# Patient Record
Sex: FEMALE | Race: WHITE | NOT HISPANIC OR LATINO | Employment: UNEMPLOYED | URBAN - METROPOLITAN AREA
[De-identification: names, ages, dates, MRNs, and addresses within clinical notes are randomized per-mention and may not be internally consistent; named-entity substitution may affect disease eponyms.]

---

## 2017-01-24 ENCOUNTER — ALLSCRIPTS OFFICE VISIT (OUTPATIENT)
Dept: OTHER | Facility: OTHER | Age: 47
End: 2017-01-24

## 2017-01-26 ENCOUNTER — GENERIC CONVERSION - ENCOUNTER (OUTPATIENT)
Dept: OTHER | Facility: OTHER | Age: 47
End: 2017-01-26

## 2018-01-13 VITALS
WEIGHT: 100.4 LBS | BODY MASS INDEX: 17.79 KG/M2 | DIASTOLIC BLOOD PRESSURE: 64 MMHG | HEART RATE: 94 BPM | RESPIRATION RATE: 16 BRPM | SYSTOLIC BLOOD PRESSURE: 104 MMHG | HEIGHT: 63 IN | TEMPERATURE: 100.4 F

## 2018-01-15 NOTE — MISCELLANEOUS
Message  Return to work or school:   Vinny Vasquez is under my professional care  She was seen in my office on 01/24/2017       PLEASE EXCUSE FROM WORK 01/25/2017, 01/26/2017, AND 01/27/2017  Signatures   Electronically signed by :  Chidi Nails DO; Jan 27 2017  2:19PM EST                       (Author)

## 2018-02-28 ENCOUNTER — TELEPHONE (OUTPATIENT)
Dept: FAMILY MEDICINE CLINIC | Facility: CLINIC | Age: 48
End: 2018-02-28

## 2018-02-28 DIAGNOSIS — G47.9 DISTURBANCE, SLEEP: Primary | ICD-10-CM

## 2018-02-28 RX ORDER — ESZOPICLONE 3 MG/1
3 TABLET, FILM COATED ORAL
Qty: 30 TABLET | Refills: 0 | Status: SHIPPED | OUTPATIENT
Start: 2018-02-28 | End: 2018-04-16 | Stop reason: SDUPTHER

## 2018-02-28 RX ORDER — ESZOPICLONE 3 MG/1
1 TABLET, FILM COATED ORAL
COMMUNITY
Start: 2011-01-14 | End: 2018-02-28 | Stop reason: SDUPTHER

## 2018-02-28 NOTE — TELEPHONE ENCOUNTER
Patient is requesting a refill on ESCOPICLONE 3 mg 1 bedtime #06 [id number]

## 2018-04-16 ENCOUNTER — TELEPHONE (OUTPATIENT)
Dept: FAMILY MEDICINE CLINIC | Facility: CLINIC | Age: 48
End: 2018-04-16

## 2018-04-16 DIAGNOSIS — G47.9 DISTURBANCE, SLEEP: ICD-10-CM

## 2018-04-16 RX ORDER — ESZOPICLONE 3 MG/1
3 TABLET, FILM COATED ORAL
Qty: 30 TABLET | Refills: 0 | Status: SHIPPED | OUTPATIENT
Start: 2018-04-16 | End: 2018-05-08 | Stop reason: SDUPTHER

## 2018-04-16 NOTE — TELEPHONE ENCOUNTER
LM for patient to CB    Please inform patient she needs to sign a controlled substance agreement and also she needs an appt, last seen 01/2017

## 2018-05-08 ENCOUNTER — OFFICE VISIT (OUTPATIENT)
Dept: FAMILY MEDICINE CLINIC | Facility: CLINIC | Age: 48
End: 2018-05-08
Payer: COMMERCIAL

## 2018-05-08 VITALS
TEMPERATURE: 97.6 F | DIASTOLIC BLOOD PRESSURE: 96 MMHG | SYSTOLIC BLOOD PRESSURE: 132 MMHG | BODY MASS INDEX: 18.95 KG/M2 | RESPIRATION RATE: 18 BRPM | HEART RATE: 104 BPM | WEIGHT: 107 LBS

## 2018-05-08 DIAGNOSIS — G47.9 DISTURBANCE, SLEEP: ICD-10-CM

## 2018-05-08 PROCEDURE — 99213 OFFICE O/P EST LOW 20 MIN: CPT | Performed by: INTERNAL MEDICINE

## 2018-05-08 RX ORDER — ESTROGEN,CON/M-PROGEST ACET 0.45-1.5MG
1 TABLET ORAL DAILY
Refills: 0 | COMMUNITY
Start: 2018-04-16 | End: 2020-11-13 | Stop reason: SDUPTHER

## 2018-05-08 RX ORDER — ESZOPICLONE 3 MG/1
3 TABLET, FILM COATED ORAL
Qty: 90 TABLET | Refills: 0 | Status: SHIPPED | OUTPATIENT
Start: 2018-05-08 | End: 2018-10-05 | Stop reason: SDUPTHER

## 2018-05-08 RX ORDER — MULTIVITAMIN
1 CAPSULE ORAL DAILY
COMMUNITY

## 2018-05-08 RX ORDER — IBANDRONATE SODIUM 150 MG/1
TABLET, FILM COATED ORAL
Refills: 0 | COMMUNITY
Start: 2018-05-01

## 2018-05-08 NOTE — PROGRESS NOTES
ASSESSMENT and PLAN:  Alka Almazan is a 56 y o  female with:   Problem List Items Addressed This Visit     Disturbance, sleep    Relevant Medications    eszopiclone (LUNESTA) tablet          SUBJECTIVE:  Alka Almazan is a 56 y o  female who presents today with a chief complaint of Medication Refill (Suzie Reynolds)    Patient here for a refill of lunesta  She uses it nightly - otherwise she can't sleep  She is taking 3 mg per night  She otherwise feels well; she exervcises; she offers no other complaints  Review of Systems   Constitutional: Negative  HENT: Negative  Eyes: Negative  Respiratory: Negative  Cardiovascular: Negative  Gastrointestinal: Negative  Genitourinary: Negative  Musculoskeletal: Negative  Skin: Negative  Hematological: Negative  Psychiatric/Behavioral: Positive for sleep disturbance (not able to sleep without a sleep aid; otc meds did not help   she gets 6-8 hours per night and feels rested  )  Negative for decreased concentration, dysphoric mood, self-injury and suicidal ideas  The patient is not nervous/anxious and is not hyperactive  I have reviewed the patient's PMH, Social History, Medication List and Allergies  OBJECTIVE:  /96 (BP Location: Left arm, Patient Position: Sitting, Cuff Size: Adult)   Pulse 104   Temp 97 6 °F (36 4 °C) (Tympanic)   Resp 18   Wt 48 5 kg (107 lb)   BMI 18 95 kg/m²   Physical Exam   Constitutional: She is oriented to person, place, and time  She appears well-developed and well-nourished  No distress  HENT:   Head: Normocephalic and atraumatic  Right Ear: External ear normal    Left Ear: External ear normal    Eyes: Conjunctivae and EOM are normal  Pupils are equal, round, and reactive to light  Right eye exhibits no discharge  Left eye exhibits no discharge  Neck: Normal range of motion  Neck supple  No JVD present  No tracheal deviation present  No thyromegaly present     Cardiovascular: Normal rate, regular rhythm, normal heart sounds and intact distal pulses  Pulmonary/Chest: Effort normal and breath sounds normal  No respiratory distress  She has no wheezes  Abdominal: Soft  Bowel sounds are normal  She exhibits no distension  There is no tenderness  Musculoskeletal: Normal range of motion  She exhibits no edema or tenderness  Neurological: She is alert and oriented to person, place, and time  She has normal reflexes  No cranial nerve deficit  Coordination normal    Skin: Skin is warm and dry  No rash noted  She is not diaphoretic  No erythema  Psychiatric: She has a normal mood and affect  Her behavior is normal  Judgment and thought content normal    Nursing note and vitals reviewed

## 2018-05-09 PROBLEM — G47.9 DISTURBANCE, SLEEP: Status: ACTIVE | Noted: 2018-05-09

## 2018-05-09 NOTE — PATIENT INSTRUCTIONS
Insomnia   WHAT YOU NEED TO KNOW:   Insomnia is a condition that makes it hard to fall or stay asleep  Lack of sleep can lead to attention or memory problems during the day  You may also be travis, depressed, clumsy, or have headaches  DISCHARGE INSTRUCTIONS:   Contact your healthcare provider if:   · Your symptoms do not get better, or they get worse  · You begin to use drugs or alcohol to fall asleep  · You have questions or concerns about your condition or care  Medicines:   · Medicines  may help you sleep more regularly or help you feel less anxious  · Take your medicine as directed  Contact your healthcare provider if you think your medicine is not helping or if you have side effects  Tell him or her if you are allergic to any medicine  Keep a list of the medicines, vitamins, and herbs you take  Include the amounts, and when and why you take them  Bring the list or the pill bottles to follow-up visits  Carry your medicine list with you in case of an emergency  What you can do to improve your sleep:   · Create a sleep schedule  This will help you form a sleep routine  Keep a record of your sleep patterns, and any sleeping problems you have  Bring the record to follow-up visits with healthcare providers  · Do not take naps  Naps could make it hard for you to fall asleep at bedtime  · Keep your bedroom cool, quiet, and dark  Turn on white noise, such as a fan, to help you relax  Do not use your bed for any activity that will keep you awake  Do not read, exercise, eat, or watch TV in your bedroom  · Get up if you do not fall asleep within 20 minutes  Move to another room and do something relaxing until you become sleepy  · Limit caffeine, alcohol, and food to earlier in the day  Only drink caffeine in the morning  Do not drink alcohol within 6 hours of bedtime  Do not eat a heavy meal right before you go to bed  · Exercise regularly  Daily exercise may help you sleep better   Do not exercise within 4 hours of bedtime  Follow up with your healthcare provider as directed: Your healthcare provider may refer you for cognitive behavioral therapy  A behavioral therapist may help you find ways to relax, decrease stress, and improve sleep  Write down your questions so you remember to ask them during your visits  © 2017 [address] Information is for End User's use only and may not be sold, redistributed or otherwise used for commercial purposes  All illustrations and images included in CareNotes® are the copyrighted property of [employer] , [employer]  or Nate Ulloa  The above information is an  only  It is not intended as medical advice for individual conditions or treatments  Talk to your doctor, nurse or pharmacist before following any medical regimen to see if it is safe and effective for you

## 2018-10-05 DIAGNOSIS — G47.9 DISTURBANCE, SLEEP: ICD-10-CM

## 2018-10-05 RX ORDER — ESZOPICLONE 3 MG/1
3 TABLET, FILM COATED ORAL
Qty: 90 TABLET | Refills: 0 | Status: SHIPPED | OUTPATIENT
Start: 2018-10-05 | End: 2019-01-22 | Stop reason: SDUPTHER

## 2019-01-22 DIAGNOSIS — G47.9 DISTURBANCE, SLEEP: ICD-10-CM

## 2019-01-22 RX ORDER — ESZOPICLONE 3 MG/1
3 TABLET, FILM COATED ORAL
Qty: 30 TABLET | Refills: 0 | Status: SHIPPED | OUTPATIENT
Start: 2019-01-22 | End: 2020-01-02

## 2019-01-22 NOTE — TELEPHONE ENCOUNTER
Patient requesting a 90 day supply of Lunesta, 3 mg tablets to [facility] in [place] [address] site accessed, last fill on 10/08/2018  Last office visit on 05/08/2018, next office visit on 02/22/2019

## 2019-02-22 PROBLEM — F51.04 PSYCHOPHYSIOLOGICAL INSOMNIA: Status: ACTIVE | Noted: 2018-05-09

## 2019-02-26 ENCOUNTER — HOSPITAL ENCOUNTER (EMERGENCY)
Facility: HOSPITAL | Age: 49
Discharge: HOME | End: 2019-02-26
Attending: EMERGENCY MEDICINE | Admitting: EMERGENCY MEDICINE
Payer: COMMERCIAL

## 2019-02-26 VITALS
HEIGHT: 66 IN | BODY MASS INDEX: 16.87 KG/M2 | RESPIRATION RATE: 18 BRPM | DIASTOLIC BLOOD PRESSURE: 70 MMHG | WEIGHT: 104.94 LBS | TEMPERATURE: 98.2 F | OXYGEN SATURATION: 98 % | HEART RATE: 99 BPM | SYSTOLIC BLOOD PRESSURE: 142 MMHG

## 2019-02-26 DIAGNOSIS — F10.10 ALCOHOL ABUSE: Primary | ICD-10-CM

## 2019-02-26 LAB — ETHANOL EXG-MCNC: 0.35 MG/DL

## 2019-02-26 PROCEDURE — 99284 EMERGENCY DEPT VISIT MOD MDM: CPT

## 2019-02-26 PROCEDURE — 82075 ASSAY OF BREATH ETHANOL: CPT | Performed by: EMERGENCY MEDICINE

## 2019-02-26 NOTE — ED PROVIDER NOTES
History  Chief Complaint   Patient presents with    Alcohol Problem     Been wine drinking since 10am this mrchantel, reports difficulties going on at home  Pt reports being an alcoholic, reports binge drinking 3 times a week  " I just need something for the withdrawals " Is in outpatient counceling  Last drink was at 3m     72-year-old female comes in for alcohol abuse  Patient states that she has a known alcoholic and that she has been binge drinking since 10 o'clock this morning  Patient is currently in an outpatient counseling program   Patient specifically requests something for the withdrawals  I told patient that she is not currently withdrawing and that I would not be comfortable giving her anything at this time  Patient denies any injury  Patient states that she is only here because her mom made me come  I asked her she wanted help and she said yes however her idea of help is to get Librium and go home  I have offered patient to stay until she is below 0 08 to talk to crisis about different treatment options or we can give her a list of options and she is free to leave at this time as she has no withdrawal symptoms and is awake alert oriented x3 does not have any slurred speech and is able to walk under her own power however she must have a sober ride home  History provided by:  Spouse   used: No    Alcohol Intoxication   Similar prior episodes: yes    Severity:  Severe  Onset quality:  Gradual  Timing:  Constant  Progression:  Worsening  Chronicity:  Chronic  Suspected agents:  Alcohol  Associated symptoms: no abdominal pain, no agitation, no headaches and no shortness of breath    Risk factors: addiction treatment    Risk factors: no recent illness and no withdrawal syndrome        Prior to Admission Medications   Prescriptions Last Dose Informant Patient Reported? Taking?    Multiple Vitamin (MULTIVITAMIN) capsule   Yes No   Sig: Take 1 capsule by mouth daily   PREMPRO 0  45-1 5 MG per tablet  Self Yes No   Sig: Take 1 tablet by mouth daily   eszopiclone (LUNESTA) 3 MG tablet   No No   Sig: Take 1 tablet (3 mg total) by mouth daily at bedtime   ibandronate (BONIVA) 150 MG tablet  Self Yes No   Sig: take 1 tablet by mouth every month WITH 8 TO 10 OUNCES OF WATER,    (REFER TO PRESCRIPTION NOTES)  Facility-Administered Medications: None       Past Medical History:   Diagnosis Date    Ovarian failure        History reviewed  No pertinent surgical history  Family History   Problem Relation Age of Onset    Uterine cancer Mother     Diabetes Father     Hypertension Father         essential     I have reviewed and agree with the history as documented  Social History     Tobacco Use    Smoking status: Current Some Day Smoker     Packs/day: 0 25    Smokeless tobacco: Never Used   Substance Use Topics    Alcohol use: Yes     Comment: occasionally    Drug use: No        Review of Systems   Constitutional: Negative for fatigue and fever  HENT: Negative for congestion and ear pain  Eyes: Negative for discharge and redness  Respiratory: Negative for apnea, cough, shortness of breath and wheezing  Cardiovascular: Negative for chest pain  Gastrointestinal: Negative for abdominal pain and diarrhea  Endocrine: Negative for cold intolerance and polydipsia  Genitourinary: Negative for difficulty urinating and hematuria  Musculoskeletal: Negative for arthralgias and back pain  Skin: Negative for color change and rash  Allergic/Immunologic: Negative for environmental allergies and immunocompromised state  Neurological: Negative for numbness and headaches  Hematological: Negative for adenopathy  Does not bruise/bleed easily  Psychiatric/Behavioral: Negative for agitation and behavioral problems  Physical Exam  Physical Exam   Constitutional: She is oriented to person, place, and time   Vital signs are normal  She appears well-developed and well-nourished  Non-toxic appearance  HENT:   Head: Normocephalic and atraumatic  Right Ear: Tympanic membrane and external ear normal    Left Ear: Tympanic membrane and external ear normal    Nose: Nose normal  No rhinorrhea, sinus tenderness or nasal deformity  Mouth/Throat: Uvula is midline and oropharynx is clear and moist  Normal dentition  Eyes: Pupils are equal, round, and reactive to light  Conjunctivae, EOM and lids are normal  Right eye exhibits no discharge  Left eye exhibits no discharge  Neck: Trachea normal and normal range of motion  Neck supple  No JVD present  Carotid bruit is not present  Cardiovascular: Normal rate, regular rhythm, intact distal pulses and normal pulses  No extrasystoles are present  PMI is not displaced  Pulmonary/Chest: Effort normal and breath sounds normal  No accessory muscle usage  No respiratory distress  She has no wheezes  She has no rhonchi  She has no rales  Abdominal: Soft  Normal appearance and bowel sounds are normal  She exhibits no mass  There is no tenderness  There is no rigidity, no rebound and no guarding  Musculoskeletal:        Right shoulder: She exhibits normal range of motion, no bony tenderness, no swelling and no deformity  Cervical back: Normal  She exhibits normal range of motion, no tenderness, no bony tenderness and no deformity  Lymphadenopathy:     She has no cervical adenopathy  She has no axillary adenopathy  Neurological: She is alert and oriented to person, place, and time  She has normal strength and normal reflexes  No cranial nerve deficit or sensory deficit  GCS eye subscore is 4  GCS verbal subscore is 5  GCS motor subscore is 6  Skin: Skin is warm and dry  No rash noted  Psychiatric: She has a normal mood and affect  Her speech is normal and behavior is normal    Nursing note and vitals reviewed        Vital Signs  ED Triage Vitals [02/26/19 1814]   Temperature Pulse Respirations Blood Pressure SpO2 98 2 °F (36 8 °C) 99 18 142/70 98 %      Temp Source Heart Rate Source Patient Position - Orthostatic VS BP Location FiO2 (%)   Oral Monitor Sitting Right arm --      Pain Score       No Pain           Vitals:    02/26/19 1814   BP: 142/70   Pulse: 99   Patient Position - Orthostatic VS: Sitting       Visual Acuity      ED Medications  Medications - No data to display    Diagnostic Studies  Results Reviewed     Procedure Component Value Units Date/Time    POCT alcohol breath test [[id number]]  (Normal) Resulted:  02/26/19 1837    Lab Status:  Final result Updated:  02/26/19 1837     EXTBreath Alcohol 0 351    Rapid drug screen, urine [[id number]]     Lab Status:  No result Specimen:  Urine                  No orders to display              Procedures  Procedures       Phone Contacts  ED Phone Contact    ED Course                               MDM  Number of Diagnoses or Management Options  Alcohol abuse: new and requires workup     Amount and/or Complexity of Data Reviewed  Clinical lab tests: ordered and reviewed  Tests in the medicine section of CPT®: ordered and reviewed    Risk of Complications, Morbidity, and/or Mortality  General comments: Patient adamantly refuses inpatient  She does not want stated talked crisis stating airway have an outpatient counselor  Patient is persistent and asking for medications for withdrawal   I told her that this is not appropriate but if she wanted to come in for detox and rehab I would be happy to set her up with that  I did give patient paperwork for the host program and other outpatient resources  Patient's  here it is here states that the family is frustrated with her and they I have been trying to get her institutionalized  I explained to him that the patient does not have SI or HI and he would be un able to bring her in under psychiatric old      Patient Progress  Patient progress: stable      Disposition  Final diagnoses:   Alcohol abuse     Time reflects when diagnosis was documented in both MDM as applicable and the Disposition within this note     Time User Action Codes Description Comment    2/26/2019  6:48 PM Oli Thompson Add [F10 10] Alcohol abuse       ED Disposition     ED Disposition Condition Date/Time Comment    Discharge Stable Tu Feb 26, 2019  6:48 PM Eun Tenorio discharge to home/self care  Follow-up Information     Follow up With Specialties Details Why [id number]   [place] [address] [address]  [phone]            Patient's Medications   Discharge Prescriptions    No medications on file     No discharge procedures on file      ED Provider  Electronically Signed by           Austyn Bonner DO  02/26/19 0795

## 2019-02-26 NOTE — ED NOTES
Pt  Denies in patient counseling  MD made aware  Denies SI/HI        Haresh Maguire RN  02/26/19 [id number]

## 2019-06-03 ENCOUNTER — HOSPITAL ENCOUNTER (EMERGENCY)
Facility: HOSPITAL | Age: 49
Discharge: HOME | End: 2019-06-03
Attending: EMERGENCY MEDICINE | Admitting: EMERGENCY MEDICINE
Payer: COMMERCIAL

## 2019-06-03 VITALS
DIASTOLIC BLOOD PRESSURE: 97 MMHG | RESPIRATION RATE: 16 BRPM | TEMPERATURE: 98.1 F | OXYGEN SATURATION: 100 % | HEART RATE: 104 BPM | SYSTOLIC BLOOD PRESSURE: 124 MMHG

## 2019-06-03 DIAGNOSIS — F10.10 ALCOHOL ABUSE: ICD-10-CM

## 2019-06-03 DIAGNOSIS — F10.929 ALCOHOL INTOXICATION (HCC): Primary | ICD-10-CM

## 2019-06-03 LAB
AMPHETAMINES SERPL QL SCN: NEGATIVE
BARBITURATES UR QL: NEGATIVE
BENZODIAZ UR QL: NEGATIVE
COCAINE UR QL: NEGATIVE
ETHANOL EXG-MCNC: 0.25 MG/DL
METHADONE UR QL: NEGATIVE
OPIATES UR QL SCN: NEGATIVE
PCP UR QL: NEGATIVE
THC UR QL: NEGATIVE

## 2019-06-03 PROCEDURE — 80307 DRUG TEST PRSMV CHEM ANLYZR: CPT | Performed by: EMERGENCY MEDICINE

## 2019-06-03 PROCEDURE — 99284 EMERGENCY DEPT VISIT MOD MDM: CPT

## 2019-06-03 PROCEDURE — 99283 EMERGENCY DEPT VISIT LOW MDM: CPT | Performed by: EMERGENCY MEDICINE

## 2019-06-03 PROCEDURE — 82075 ASSAY OF BREATH ETHANOL: CPT | Performed by: EMERGENCY MEDICINE

## 2019-06-03 RX ORDER — NICOTINE 21 MG/24HR
21 PATCH, TRANSDERMAL 24 HOURS TRANSDERMAL ONCE
Status: DISCONTINUED | OUTPATIENT
Start: 2019-06-03 | End: 2019-06-03 | Stop reason: HOSPADM

## 2019-06-03 RX ADMIN — NICOTINE 21 MG: 21 PATCH TRANSDERMAL at 19:15

## 2020-01-02 ENCOUNTER — OFFICE VISIT (OUTPATIENT)
Dept: FAMILY MEDICINE CLINIC | Facility: CLINIC | Age: 50
End: 2020-01-02
Payer: COMMERCIAL

## 2020-01-02 VITALS
WEIGHT: 99 LBS | RESPIRATION RATE: 16 BRPM | BODY MASS INDEX: 15.98 KG/M2 | SYSTOLIC BLOOD PRESSURE: 138 MMHG | HEART RATE: 113 BPM | TEMPERATURE: 97.1 F | DIASTOLIC BLOOD PRESSURE: 88 MMHG | OXYGEN SATURATION: 97 %

## 2020-01-02 DIAGNOSIS — F41.9 ANXIETY: ICD-10-CM

## 2020-01-02 DIAGNOSIS — F51.04 PSYCHOPHYSIOLOGICAL INSOMNIA: Primary | ICD-10-CM

## 2020-01-02 PROCEDURE — 99214 OFFICE O/P EST MOD 30 MIN: CPT | Performed by: FAMILY MEDICINE

## 2020-01-02 RX ORDER — TRAZODONE HYDROCHLORIDE 50 MG/1
TABLET ORAL
Qty: 30 TABLET | Refills: 1 | Status: SHIPPED | OUTPATIENT
Start: 2020-01-02 | End: 2020-02-05 | Stop reason: SDUPTHER

## 2020-01-02 NOTE — PROGRESS NOTES
FAMILY MEDICINE PROGRESS NOTE  Eun Tenorio 51 y o  female   DATE: January 2, 2020     ASSESSMENT and PLAN:  Donta Varner is a 51 y o  female with:     1  Psychophysiological insomnia  Previously was on Lunesta 3mg daily, but stopped in January 2019 due to developing tolerance  Now has ongoing insomnia  Given comorbid severe anxiety, add Trazodone, call with update in 2-4 weeks and will titrate up dose  If resistant, refer to Sleep Medicine  Would try to avoid hypnotics/benzos given history of alcohol abuse  - traZODone (DESYREL) 50 mg tablet; 0 5 tab PO qHS x 1 week, then 1 tab PO qHS  Dispense: 30 tablet; Refill: 1    2  Anxiety  Pt has severe, uncontrolled anxiety, partially situational  Add Trazodone as above, likely will also need SSRI, I e  Lexapro  Stop OTC 5HTP supplements  Recommended routine labs, states she goes regularly for her Endocrinologist, so will send us records, she has labs scheduled for this month  Complete form for work, allowing her to work as long as she is compliant with treatment plan as above  SUBJECTIVE:  Donta Varner is a 51 y o  female who presents today with a chief complaint of Insomnia  Pt is here to discuss insomnia and anxiety  She previously had been on Lunesta 3mg for 15 years  But then she felt it stopped working, so stopped taking it in January 2019  She has been going to work without any sleep for days  She was at her work and nodded off at work 2 days in a row and they told her she had to go home  She needs an authorization letter to say she can get back to work  Patient works at a desk job, no heavy lifting  Normally she has no sleep schedule, tries to get to bed around 10-11pm and will be awake with racing thoughts and sleeps 1-2 hours at a stretches  She takes CBD oil and stress gummies  She takes Melatonin/Valerian root/5TP  June 2019- she had multiple ER visits and had been drinking alcohol   She states she knows she had a drinking problem at that time, but states she is no longer drinking any alcohol  Denies drug use, is smoking cigarettes  Also thinks her anxiety is out of control  She has  from her  and had a hard holiday season recently  Pt has a h/o premature ovarian failure, now has osteoporosis and f/w Dr Debra Ahumada (Endo) and is currently on Boniva for the past 9 years  Review of Systems   Constitutional: Positive for fatigue  Psychiatric/Behavioral: Positive for sleep disturbance  The patient is nervous/anxious  I have reviewed the patient's Past Medical History  Current Outpatient Medications:     ibandronate (BONIVA) 150 MG tablet, take 1 tablet by mouth every month WITH 8 TO 10 OUNCES OF WATER,    (REFER TO PRESCRIPTION NOTES)  , Disp: , Rfl: 0    Multiple Vitamin (MULTIVITAMIN) capsule, Take 1 capsule by mouth daily, Disp: , Rfl:     PREMPRO 0 45-1 5 MG per tablet, Take 1 tablet by mouth daily, Disp: , Rfl: 0    eszopiclone (LUNESTA) 3 MG tablet, Take 1 tablet (3 mg total) by mouth daily at bedtime (Patient not taking: Reported on 1/2/2020), Disp: 30 tablet, Rfl: 0    OBJECTIVE:  /88   Pulse (!) 113   Temp (!) 97 1 °F (36 2 °C)   Resp 16   Wt 44 9 kg (99 lb)   SpO2 97%   BMI 15 98 kg/m²    Physical Exam   Constitutional: No distress  underweight   Skin: She is not diaphoretic  Psychiatric: Her speech is normal and behavior is normal  Judgment and thought content normal  Her mood appears anxious  Cognition and memory are normal  She expresses no homicidal and no suicidal ideation  BMI Counseling: Body mass index is 15 98 kg/m²  The BMI is below normal  Patient advised to gain weight  Tobacco Cessation Counseling: Tobacco cessation counseling was provided  The patient is sincerely urged to quit consumption of tobacco  She is not ready to quit tobacco  Medication options and side effects of medication not discussed  Jose Lo MD    Note: Portions of the record have been created with voice recognition software  Occasional wrong word or "sound a like" substitutions may have occurred due to the inherent limitations of voice recognition software  Read the chart carefully and recognize, using context, where substitutions have occurred

## 2020-02-05 ENCOUNTER — OFFICE VISIT (OUTPATIENT)
Dept: FAMILY MEDICINE CLINIC | Facility: CLINIC | Age: 50
End: 2020-02-05
Payer: COMMERCIAL

## 2020-02-05 VITALS
OXYGEN SATURATION: 97 % | HEART RATE: 106 BPM | SYSTOLIC BLOOD PRESSURE: 130 MMHG | TEMPERATURE: 98.4 F | BODY MASS INDEX: 15.11 KG/M2 | HEIGHT: 66 IN | WEIGHT: 94 LBS | RESPIRATION RATE: 16 BRPM | DIASTOLIC BLOOD PRESSURE: 80 MMHG

## 2020-02-05 DIAGNOSIS — Z12.39 BREAST CANCER SCREENING: ICD-10-CM

## 2020-02-05 DIAGNOSIS — R22.2 MASS OF CHEST WALL, RIGHT: ICD-10-CM

## 2020-02-05 DIAGNOSIS — F10.10 ALCOHOL ABUSE: Primary | ICD-10-CM

## 2020-02-05 DIAGNOSIS — Z11.4 ENCOUNTER FOR SCREENING FOR HIV: ICD-10-CM

## 2020-02-05 DIAGNOSIS — F51.04 PSYCHOPHYSIOLOGICAL INSOMNIA: ICD-10-CM

## 2020-02-05 DIAGNOSIS — F10.231 ALCOHOL WITHDRAWAL SYNDROME, WITH DELIRIUM (HCC): ICD-10-CM

## 2020-02-05 PROCEDURE — 4004F PT TOBACCO SCREEN RCVD TLK: CPT | Performed by: FAMILY MEDICINE

## 2020-02-05 PROCEDURE — 3008F BODY MASS INDEX DOCD: CPT | Performed by: FAMILY MEDICINE

## 2020-02-05 PROCEDURE — 99214 OFFICE O/P EST MOD 30 MIN: CPT | Performed by: FAMILY MEDICINE

## 2020-02-05 RX ORDER — TRAZODONE HYDROCHLORIDE 100 MG/1
100 TABLET ORAL
Qty: 30 TABLET | Refills: 1 | Status: SHIPPED | OUTPATIENT
Start: 2020-02-05 | End: 2020-06-01 | Stop reason: SDUPTHER

## 2020-02-05 RX ORDER — LORAZEPAM 0.5 MG/1
0.5 TABLET ORAL 2 TIMES DAILY PRN
Qty: 20 TABLET | Refills: 0 | Status: SHIPPED | OUTPATIENT
Start: 2020-02-05

## 2020-02-05 RX ORDER — HYDROXYZINE HYDROCHLORIDE 25 MG/1
25 TABLET, FILM COATED ORAL 2 TIMES DAILY PRN
Qty: 30 TABLET | Refills: 0 | Status: SHIPPED | OUTPATIENT
Start: 2020-02-05 | End: 2020-02-05

## 2020-02-05 NOTE — PROGRESS NOTES
FAMILY MEDICINE PROGRESS NOTE  Eun Tenorio 51 y o  female   DATE: February 5, 2020     ASSESSMENT and PLAN:  Mauricio Lawson is a 51 y o  female with:     1  Alcohol abuse  Inconsistent history, but per most recent recollection, patient is a daily drinker and on exam appears to be going through alcohol withdrawal with DTs  Advised she needs to start thiamine and folic acid supplements, check labs  Consider acamprosate or disulfiram PRN, but check labs prior  Rx for Ativan PRN for withdrawal DTs  Pt to follow up with counselor in 2 days, consider restarting AA meetings and follow up here in 1 week or sooner PRN  - Comprehensive metabolic panel; Future  - CBC and differential; Future  - TSH, 3rd generation with Free T4 reflex; Future  - Protime-INR; Future  - Vitamin B1, whole blood; Future  - Folate; Future    2  Alcohol withdrawal syndrome, with delirium (HCC)  - LORazepam (ATIVAN) 0 5 mg tablet; Take 1 tablet (0 5 mg total) by mouth 2 (two) times a day as needed for anxiety  Dispense: 20 tablet; Refill: 0    3  Psychophysiological insomnia  Improvement in sleep with Trazodone 50mg, likely worsened now due to acute alcohol withdrawal, increase to 100mg qHS  - traZODone (DESYREL) 100 mg tablet; Take 1 tablet (100 mg total) by mouth daily at bedtime  Dispense: 30 tablet; Refill: 1    4  Mass of chest wall, right  Discussed that I am not palpating any abnormality, the location of the "bump" is her rib, likely more noticeable with her weight loss likely due to #1  If concern persists, check rib films    5  Breast cancer screening  H/o dense breast tissue  - Mammo screening bilateral w 3d & cad; Future    6  Encounter for screening for HIV  - HIV 1/2 AG-AB combo; Future    Patient agreeable with the plan and expressed understanding  I discucssed signs and symptoms for which to RTC, go to ER or seek urgent medical care       SUBJECTIVE:  Mauricio Lawson is a 51 y o  female who presents today with a chief complaint of Mass (right clavicle)  Pt here for eval of a bump on her right clavicle that she noticed after a shower when she was applying body lotion 1 week ago  It is not painful, no change in size, no rash, wasn't sure if its a bone or a pimple  She tried Proactive without relief  Also, she is requesting a medication to help with cravings for her alcohol use  Initially patient stated that her last drink was 7 days ago, after further questioning, she states it was 3 days ago and 3 glasses of wine, but then stated she had been drinking 4 days straight  Today she is shaking and thinks she is going through withdrawal  She has gone to AA in the past, but not currently  She does see a counselor regularly, has an appt this week  She states she is ready to quit because she promised her son she would  Review of Systems   Constitutional: Positive for unexpected weight change (5# weight loss in 1 month)  Skin: Negative for rash and wound  Neurological: Positive for tremors  Psychiatric/Behavioral: Positive for sleep disturbance  The patient is nervous/anxious  I have reviewed the patient's Past Medical History  OBJECTIVE:  /80   Pulse (!) 106   Temp 98 4 °F (36 9 °C)   Resp 16   Ht 5' 6" (1 676 m)   Wt 42 6 kg (94 lb)   SpO2 97%   BMI 15 17 kg/m²    Physical Exam   Constitutional: She appears cachectic  Neurological: She displays tremor  Psychiatric: Her mood appears anxious  She is agitated  Adrian Cain MD    Note: Portions of the record have been created with voice recognition software  Occasional wrong word or "sound a like" substitutions may have occurred due to the inherent limitations of voice recognition software  Read the chart carefully and recognize, using context, where substitutions have occurred

## 2020-02-26 ENCOUNTER — APPOINTMENT (OUTPATIENT)
Dept: LAB | Facility: CLINIC | Age: 50
End: 2020-02-26
Payer: COMMERCIAL

## 2020-02-26 DIAGNOSIS — Z11.4 ENCOUNTER FOR SCREENING FOR HIV: ICD-10-CM

## 2020-02-26 DIAGNOSIS — F10.10 ALCOHOL ABUSE: ICD-10-CM

## 2020-02-26 LAB
ALBUMIN SERPL BCP-MCNC: 4.1 G/DL (ref 3.5–5)
ALP SERPL-CCNC: 57 U/L (ref 46–116)
ALT SERPL W P-5'-P-CCNC: 25 U/L (ref 12–78)
ANION GAP SERPL CALCULATED.3IONS-SCNC: 8 MMOL/L (ref 4–13)
AST SERPL W P-5'-P-CCNC: 17 U/L (ref 5–45)
BASOPHILS # BLD AUTO: 0.01 THOUSANDS/ΜL (ref 0–0.1)
BASOPHILS NFR BLD AUTO: 0 % (ref 0–1)
BILIRUB SERPL-MCNC: 0.5 MG/DL (ref 0.2–1)
BUN SERPL-MCNC: 12 MG/DL (ref 5–25)
CALCIUM SERPL-MCNC: 8.9 MG/DL (ref 8.3–10.1)
CHLORIDE SERPL-SCNC: 104 MMOL/L (ref 100–108)
CO2 SERPL-SCNC: 27 MMOL/L (ref 21–32)
CREAT SERPL-MCNC: 0.68 MG/DL (ref 0.6–1.3)
EOSINOPHIL # BLD AUTO: 0.03 THOUSAND/ΜL (ref 0–0.61)
EOSINOPHIL NFR BLD AUTO: 1 % (ref 0–6)
ERYTHROCYTE [DISTWIDTH] IN BLOOD BY AUTOMATED COUNT: 14.2 % (ref 11.6–15.1)
FERRITIN SERPL-MCNC: 255 NG/ML (ref 8–388)
FOLATE SERPL-MCNC: >20 NG/ML (ref 3.1–17.5)
GFR SERPL CREATININE-BSD FRML MDRD: 104 ML/MIN/1.73SQ M
GLUCOSE P FAST SERPL-MCNC: 105 MG/DL (ref 65–99)
HCT VFR BLD AUTO: 34.9 % (ref 34.8–46.1)
HGB BLD-MCNC: 11.1 G/DL (ref 11.5–15.4)
IMM GRANULOCYTES # BLD AUTO: 0.01 THOUSAND/UL (ref 0–0.2)
IMM GRANULOCYTES NFR BLD AUTO: 0 % (ref 0–2)
INR PPP: 1.16 (ref 0.84–1.19)
IRON SATN MFR SERPL: 30 %
IRON SERPL-MCNC: 105 UG/DL (ref 50–170)
LYMPHOCYTES # BLD AUTO: 0.86 THOUSANDS/ΜL (ref 0.6–4.47)
LYMPHOCYTES NFR BLD AUTO: 24 % (ref 14–44)
MCH RBC QN AUTO: 31.7 PG (ref 26.8–34.3)
MCHC RBC AUTO-ENTMCNC: 31.8 G/DL (ref 31.4–37.4)
MCV RBC AUTO: 100 FL (ref 82–98)
MONOCYTES # BLD AUTO: 0.48 THOUSAND/ΜL (ref 0.17–1.22)
MONOCYTES NFR BLD AUTO: 14 % (ref 4–12)
NEUTROPHILS # BLD AUTO: 2.17 THOUSANDS/ΜL (ref 1.85–7.62)
NEUTS SEG NFR BLD AUTO: 61 % (ref 43–75)
NRBC BLD AUTO-RTO: 0 /100 WBCS
PLATELET # BLD AUTO: 151 THOUSANDS/UL (ref 149–390)
PMV BLD AUTO: 11.7 FL (ref 8.9–12.7)
POTASSIUM SERPL-SCNC: 3.5 MMOL/L (ref 3.5–5.3)
PROT SERPL-MCNC: 7.4 G/DL (ref 6.4–8.2)
PROTHROMBIN TIME: 14.4 SECONDS (ref 11.6–14.5)
RBC # BLD AUTO: 3.5 MILLION/UL (ref 3.81–5.12)
SODIUM SERPL-SCNC: 139 MMOL/L (ref 136–145)
T4 FREE SERPL-MCNC: 0.7 NG/DL (ref 0.76–1.46)
TIBC SERPL-MCNC: 351 UG/DL (ref 250–450)
TSH SERPL DL<=0.05 MIU/L-ACNC: 6.97 UIU/ML (ref 0.36–3.74)
VIT B12 SERPL-MCNC: 955 PG/ML (ref 100–900)
WBC # BLD AUTO: 3.56 THOUSAND/UL (ref 4.31–10.16)

## 2020-02-26 PROCEDURE — 83550 IRON BINDING TEST: CPT

## 2020-02-26 PROCEDURE — 82746 ASSAY OF FOLIC ACID SERUM: CPT

## 2020-02-26 PROCEDURE — 84439 ASSAY OF FREE THYROXINE: CPT

## 2020-02-26 PROCEDURE — 87389 HIV-1 AG W/HIV-1&-2 AB AG IA: CPT

## 2020-02-26 PROCEDURE — 84425 ASSAY OF VITAMIN B-1: CPT

## 2020-02-26 PROCEDURE — 84443 ASSAY THYROID STIM HORMONE: CPT

## 2020-02-26 PROCEDURE — 82728 ASSAY OF FERRITIN: CPT

## 2020-02-26 PROCEDURE — 83540 ASSAY OF IRON: CPT

## 2020-02-26 PROCEDURE — 80053 COMPREHEN METABOLIC PANEL: CPT

## 2020-02-26 PROCEDURE — 36415 COLL VENOUS BLD VENIPUNCTURE: CPT

## 2020-02-26 PROCEDURE — 82607 VITAMIN B-12: CPT

## 2020-02-26 PROCEDURE — 85610 PROTHROMBIN TIME: CPT

## 2020-02-26 PROCEDURE — 85025 COMPLETE CBC W/AUTO DIFF WBC: CPT

## 2020-02-27 ENCOUNTER — TELEPHONE (OUTPATIENT)
Dept: FAMILY MEDICINE CLINIC | Facility: CLINIC | Age: 50
End: 2020-02-27

## 2020-02-27 DIAGNOSIS — E03.9 ACQUIRED HYPOTHYROIDISM: ICD-10-CM

## 2020-02-27 DIAGNOSIS — D50.8 IRON DEFICIENCY ANEMIA SECONDARY TO INADEQUATE DIETARY IRON INTAKE: Primary | ICD-10-CM

## 2020-02-27 PROBLEM — F10.10 ALCOHOL ABUSE: Status: ACTIVE | Noted: 2020-02-27

## 2020-02-27 LAB — HIV 1+2 AB+HIV1 P24 AG SERPL QL IA: NORMAL

## 2020-02-27 RX ORDER — LEVOTHYROXINE SODIUM 0.05 MG/1
50 TABLET ORAL DAILY
Qty: 30 TABLET | Refills: 1 | Status: SHIPPED | OUTPATIENT
Start: 2020-02-27 | End: 2020-06-01 | Stop reason: SDUPTHER

## 2020-02-27 NOTE — TELEPHONE ENCOUNTER
Patient called back & wanted to know about her anxiety medication  She said she has 7 Lorazepam tablets left  Should she continue with this medication?

## 2020-02-27 NOTE — TELEPHONE ENCOUNTER
Please call Stanley Aden and let her know that her labs were normal, except  1  Her iron level is low, likely due to poor nutrition, so start taking OTC iron supplements daily and increase iron rich foods in her diet  2  Her thyroid function is low, between this and her anemia, it is likely contributing to her fatigue, I have sent in a Rx for Levothyroxine 50mcg and repeat labs in 6 weeks  Thank you! Appointment on 02/26/2020   Component Date Value Ref Range Status    Sodium 02/26/2020 139  136 - 145 mmol/L Final    Potassium 02/26/2020 3 5  3 5 - 5 3 mmol/L Final    Chloride 02/26/2020 104  100 - 108 mmol/L Final    CO2 02/26/2020 27  21 - 32 mmol/L Final    ANION GAP 02/26/2020 8  4 - 13 mmol/L Final    BUN 02/26/2020 12  5 - 25 mg/dL Final    Creatinine 02/26/2020 0 68  0 60 - 1 30 mg/dL Final    Standardized to IDMS reference method    Glucose, Fasting 02/26/2020 105* 65 - 99 mg/dL Final      Specimen collection should occur prior to Sulfasalazine administration due to the potential for falsely depressed results  Specimen collection should occur prior to Sulfapyridine administration due to the potential for falsely elevated results   Calcium 02/26/2020 8 9  8 3 - 10 1 mg/dL Final    AST 02/26/2020 17  5 - 45 U/L Final      Specimen collection should occur prior to Sulfasalazine administration due to the potential for falsely depressed results   ALT 02/26/2020 25  12 - 78 U/L Final      Specimen collection should occur prior to Sulfasalazine and/or Sulfapyridine administration due to the potential for falsely depressed results       Alkaline Phosphatase 02/26/2020 57  46 - 116 U/L Final    Total Protein 02/26/2020 7 4  6 4 - 8 2 g/dL Final    Albumin 02/26/2020 4 1  3 5 - 5 0 g/dL Final    Total Bilirubin 02/26/2020 0 50  0 20 - 1 00 mg/dL Final    eGFR 02/26/2020 104  ml/min/1 73sq m Final    WBC 02/26/2020 3 56* 4 31 - 10 16 Thousand/uL Final    RBC 02/26/2020 3 50* 3 81 - 5 12 Million/uL Final    Hemoglobin 02/26/2020 11 1* 11 5 - 15 4 g/dL Final    Hematocrit 02/26/2020 34 9  34 8 - 46 1 % Final    MCV 02/26/2020 100* 82 - 98 fL Final    MCH 02/26/2020 31 7  26 8 - 34 3 pg Final    MCHC 02/26/2020 31 8  31 4 - 37 4 g/dL Final    RDW 02/26/2020 14 2  11 6 - 15 1 % Final    MPV 02/26/2020 11 7  8 9 - 12 7 fL Final    Platelets 63/54/5912 151  149 - 390 Thousands/uL Final    nRBC 02/26/2020 0  /100 WBCs Final    Neutrophils Relative 02/26/2020 61  43 - 75 % Final    Immat GRANS % 02/26/2020 0  0 - 2 % Final    Lymphocytes Relative 02/26/2020 24  14 - 44 % Final    Monocytes Relative 02/26/2020 14* 4 - 12 % Final    Eosinophils Relative 02/26/2020 1  0 - 6 % Final    Basophils Relative 02/26/2020 0  0 - 1 % Final    Neutrophils Absolute 02/26/2020 2 17  1 85 - 7 62 Thousands/µL Final    Immature Grans Absolute 02/26/2020 0 01  0 00 - 0 20 Thousand/uL Final    Lymphocytes Absolute 02/26/2020 0 86  0 60 - 4 47 Thousands/µL Final    Monocytes Absolute 02/26/2020 0 48  0 17 - 1 22 Thousand/µL Final    Eosinophils Absolute 02/26/2020 0 03  0 00 - 0 61 Thousand/µL Final    Basophils Absolute 02/26/2020 0 01  0 00 - 0 10 Thousands/µL Final    TSH 3RD GENERATON 02/26/2020 6 970* 0 358 - 3 740 uIU/mL Final      The recommended reference ranges for TSH during pregnancy are as follows:   First trimester 0 1 to 2 5 uIU/mL   Second trimester  0 2 to 3 0 uIU/mL   Third trimester 0 3 to 3 0 uIU/m    Note: Normal ranges may not apply to patients who are transgender, non-binary, or whose legal sex, sex at birth, and gender identity differ  Using supplements with high doses of biotin 20 to more than 300 times greater than the adequate daily intake for adults of 30 mcg/day as established by the [employer], can cause falsely depress results      Protime 02/26/2020 14 4  11 6 - 14 5 seconds Final    INR 02/26/2020 1 16  0 84 - 1 19 Final    Folate 02/26/2020 >20 0* 3 1 - 17 5 ng/mL Final    E521    Free T4 02/26/2020 0 70* 0 76 - 1 46 ng/dL Final      Specimen collection should occur prior to Sulfasalazine administration due to the potential for falsely elevated results   Iron Saturation 02/26/2020 30  % Final    TIBC 02/26/2020 351  250 - 450 ug/dL Final    Iron 02/26/2020 105  50 - 170 ug/dL Final      Patients treated with metal-binding drugs (ie  Deferoxamine) may have depressed iron values      Ferritin 02/26/2020 255  8 - 388 ng/mL Final    Vitamin B-12 02/26/2020 955* 100 - 900 pg/mL Final

## 2020-02-27 NOTE — TELEPHONE ENCOUNTER
Spoke with patient, she denies drinking and stated that she has no desire and is feeling great, she's having labs done in 6 weeks and made an appoint to come see you for F/U right after the labs

## 2020-02-27 NOTE — TELEPHONE ENCOUNTER
No, that medication was to help with her withdrawal symptoms because she had stopped drinking alcohol  But I believe she has started drinking alcohol again since our last visit, so that is not an appropriate medication while she is drinking, it is only for a short term to help her through withdrawal, but since she isnt withdrawing any longer, she shouldn't be taking it   Please encourage her to follow up with me or rehab in regards to ongoing alcohol use issues

## 2020-02-29 LAB — VIT B1 BLD-SCNC: 151.9 NMOL/L (ref 66.5–200)

## 2020-04-28 ENCOUNTER — TELEPHONE (OUTPATIENT)
Dept: FAMILY MEDICINE CLINIC | Facility: CLINIC | Age: 50
End: 2020-04-28

## 2020-05-07 ENCOUNTER — TELEPHONE (OUTPATIENT)
Dept: FAMILY MEDICINE CLINIC | Facility: CLINIC | Age: 50
End: 2020-05-07

## 2020-06-01 DIAGNOSIS — F51.04 PSYCHOPHYSIOLOGICAL INSOMNIA: ICD-10-CM

## 2020-06-01 DIAGNOSIS — E03.9 ACQUIRED HYPOTHYROIDISM: ICD-10-CM

## 2020-06-01 RX ORDER — TRAZODONE HYDROCHLORIDE 100 MG/1
100 TABLET ORAL
Qty: 30 TABLET | Refills: 0 | Status: SHIPPED | OUTPATIENT
Start: 2020-06-01

## 2020-06-01 RX ORDER — LEVOTHYROXINE SODIUM 0.05 MG/1
50 TABLET ORAL DAILY
Qty: 30 TABLET | Refills: 0 | Status: SHIPPED | OUTPATIENT
Start: 2020-06-01

## 2020-11-13 ENCOUNTER — OFFICE VISIT (OUTPATIENT)
Dept: OBGYN CLINIC | Facility: CLINIC | Age: 50
End: 2020-11-13
Payer: COMMERCIAL

## 2020-11-13 VITALS
DIASTOLIC BLOOD PRESSURE: 70 MMHG | SYSTOLIC BLOOD PRESSURE: 100 MMHG | WEIGHT: 95.2 LBS | HEIGHT: 66 IN | BODY MASS INDEX: 15.3 KG/M2

## 2020-11-13 DIAGNOSIS — N95.1 MENOPAUSAL SYMPTOMS: ICD-10-CM

## 2020-11-13 DIAGNOSIS — Z12.31 ENCOUNTER FOR SCREENING MAMMOGRAM FOR BREAST CANCER: ICD-10-CM

## 2020-11-13 DIAGNOSIS — Z01.419 ENCNTR FOR GYN EXAM (GENERAL) (ROUTINE) W/O ABN FINDINGS: Primary | ICD-10-CM

## 2020-11-13 DIAGNOSIS — Z12.4 SCREENING FOR MALIGNANT NEOPLASM OF THE CERVIX: ICD-10-CM

## 2020-11-13 PROCEDURE — G0145 SCR C/V CYTO,THINLAYER,RESCR: HCPCS | Performed by: OBSTETRICS & GYNECOLOGY

## 2020-11-13 PROCEDURE — 99386 PREV VISIT NEW AGE 40-64: CPT | Performed by: OBSTETRICS & GYNECOLOGY

## 2020-11-13 PROCEDURE — 87624 HPV HI-RISK TYP POOLED RSLT: CPT | Performed by: OBSTETRICS & GYNECOLOGY

## 2020-11-13 PROCEDURE — 3008F BODY MASS INDEX DOCD: CPT | Performed by: OBSTETRICS & GYNECOLOGY

## 2020-11-13 PROCEDURE — 4004F PT TOBACCO SCREEN RCVD TLK: CPT | Performed by: OBSTETRICS & GYNECOLOGY

## 2020-11-13 RX ORDER — IBANDRONATE SODIUM 150 MG/1
150 TABLET, FILM COATED ORAL
Qty: 3 TABLET | Refills: 3 | Status: CANCELLED | OUTPATIENT
Start: 2020-11-13

## 2020-11-13 RX ORDER — ESTROGEN,CON/M-PROGEST ACET 0.45-1.5MG
1 TABLET ORAL DAILY
Qty: 90 TABLET | Refills: 3 | Status: SHIPPED | OUTPATIENT
Start: 2020-11-13

## 2020-11-18 LAB
HPV HR 12 DNA CVX QL NAA+PROBE: NEGATIVE
HPV16 DNA CVX QL NAA+PROBE: NEGATIVE
HPV18 DNA CVX QL NAA+PROBE: NEGATIVE

## 2020-11-19 LAB
LAB AP GYN PRIMARY INTERPRETATION: NORMAL
Lab: NORMAL

## 2020-11-30 ENCOUNTER — TRANSCRIBE ORDERS (OUTPATIENT)
Dept: LAB | Facility: CLINIC | Age: 50
End: 2020-11-30

## 2021-03-31 DIAGNOSIS — Z23 ENCOUNTER FOR IMMUNIZATION: ICD-10-CM

## 2021-05-02 ENCOUNTER — IMMUNIZATIONS (OUTPATIENT)
Dept: FAMILY MEDICINE CLINIC | Facility: HOSPITAL | Age: 51
End: 2021-05-02

## 2021-05-02 DIAGNOSIS — Z23 ENCOUNTER FOR IMMUNIZATION: Primary | ICD-10-CM

## 2021-05-02 PROCEDURE — 91300 SARS-COV-2 / COVID-19 MRNA VACCINE (PFIZER-BIONTECH) 30 MCG: CPT

## 2021-05-02 PROCEDURE — 0001A SARS-COV-2 / COVID-19 MRNA VACCINE (PFIZER-BIONTECH) 30 MCG: CPT

## 2021-05-23 ENCOUNTER — IMMUNIZATIONS (OUTPATIENT)
Dept: FAMILY MEDICINE CLINIC | Facility: HOSPITAL | Age: 51
End: 2021-05-23

## 2021-05-23 DIAGNOSIS — Z23 ENCOUNTER FOR IMMUNIZATION: Primary | ICD-10-CM

## 2021-05-23 PROCEDURE — 91300 SARS-COV-2 / COVID-19 MRNA VACCINE (PFIZER-BIONTECH) 30 MCG: CPT

## 2021-05-23 PROCEDURE — 0002A SARS-COV-2 / COVID-19 MRNA VACCINE (PFIZER-BIONTECH) 30 MCG: CPT
